# Patient Record
Sex: MALE | Race: WHITE | NOT HISPANIC OR LATINO | Employment: OTHER | URBAN - METROPOLITAN AREA
[De-identification: names, ages, dates, MRNs, and addresses within clinical notes are randomized per-mention and may not be internally consistent; named-entity substitution may affect disease eponyms.]

---

## 2020-11-11 DIAGNOSIS — M19.032 OSTEOARTHRITIS OF LEFT WRIST: ICD-10-CM

## 2020-11-11 DIAGNOSIS — M65.312 TRIGGER THUMB OF LEFT HAND: Primary | ICD-10-CM

## 2020-11-12 ENCOUNTER — CLINICAL SUPPORT (OUTPATIENT)
Dept: REHABILITATION | Facility: HOSPITAL | Age: 60
End: 2020-11-12
Payer: COMMERCIAL

## 2020-11-12 DIAGNOSIS — R29.898 LEFT HAND WEAKNESS: ICD-10-CM

## 2020-11-12 DIAGNOSIS — M25.542 PAIN IN JOINT OF LEFT HAND: Primary | ICD-10-CM

## 2020-11-12 DIAGNOSIS — M25.642 STIFFNESS OF LEFT HAND JOINT: ICD-10-CM

## 2020-11-12 DIAGNOSIS — M19.032 OSTEOARTHRITIS OF LEFT WRIST, UNSPECIFIED OSTEOARTHRITIS TYPE: ICD-10-CM

## 2020-11-12 DIAGNOSIS — M25.442 EFFUSION OF JOINT OF LEFT HAND: ICD-10-CM

## 2020-11-12 DIAGNOSIS — M65.312 TRIGGER THUMB OF LEFT HAND: ICD-10-CM

## 2020-11-12 PROCEDURE — 97110 THERAPEUTIC EXERCISES: CPT | Mod: PN

## 2020-11-12 PROCEDURE — 97165 OT EVAL LOW COMPLEX 30 MIN: CPT | Mod: PN

## 2020-11-12 NOTE — PLAN OF CARE
[facility] Occupational Therapy  Initial Evaluation     Date: 11/12/2020  Name: Oliver Porras  Clinic Number: [id number]    Therapy Diagnosis:   Encounter Diagnoses   Name Primary?    Trigger thumb of left hand     Osteoarthritis of left wrist, unspecified osteoarthritis type     Pain in joint of left hand Yes    Effusion of joint of left hand     Stiffness of left hand joint     Left hand weakness      Physician: Shaji Robb III,*    Physician Orders: evaluate and tx, see epic  Medical Diagnosis: left trigger thumb, OA; left trigger thumb release, proximal row carpectomy  Surgical Procedure and Date: see above, 10-22-20  Evaluation Date: 11/12/2020  Insurance Authorization Period Expiration: referral [id number] 11-11-20 thru 12-31-20  Plan of Care Certification Period: 11-12-20 thru 12-24-20  Date of Return to MD: 12-9-20    Visit # / Visits authorized: 1 / 20  Time In:1245  Time Out: 115  Total Billable Time: 15 minutes    Precautions:  universal  Subjective     Involved Side: left  Dominant Side: right  Date of Onset: about 6-8 months pre surgery  History of Current Condition/Mechanism of Injury: insidious onset, had surgery, had sutures removed and thermoplast wrist splint made at hand therapy clinic linked with referring doctor's office, recently sent here for rehab since closer to home  Imaging: none in epic  Previous Therapy: see above, does state was given wrist and digital ROM    Past Medical History/Physical Systems Review:   Oliver Porras  has no past medical history on file.    Oliver Porras  has no past surgical history on file.    Oliver currently has no medications in their medication list.    Review of patient's allergies indicates:  Not on File     Patient's Goals for Therapy: full painless use    Pain:  Functional Pain Scale Rating 0-10:   1/10 on average  0/10 at best  2/10 at worst  Location: diffuse thru wrist and hand  Description: ache  Aggravating  Factors: light use in splint  Easing Factors: rest  Occupation:  retired  Working presently: no  Duties: Normal self and home care tasks    Functional Limitations/Social History:    Previous functional status includes: Independent with all ADLs.     Current Functional Status   Home/Living environment : lives with nobody    Limitation of Functional Status as follows: decreased motion,  force, and endurance with all tasks   ADLs/IADLs:               See above   Leisure: not tested  pain meds: denies  nicotine: denies  caffeine: 6-10 cups of regular coffee daily    Objective   Posture:in volar wrist thermoplast splint, visually edematous wrist and hand, healed incisions per surgery  Palpation:nt  Sensation:intermittent resolving numbness thumb pulp  ROM:   arom sup/pro 40/80, df/pf 60/80, rd/ud 20/40    Digital prom full except thumb opp on right dpc vs left base of sf and mild tightness on index for composite flex and long for intrinsic tightness    Edema:circumferential     Wrist right 18.0 cm left 20.6 cm        CMS Impairment/Limitation/Restriction for FOTO  Survey    Therapist reviewed FOTO scores for Oliver Porras on 11/12/2020.   FOTO documents entered into [employer] - see Media section.    Limitation Score: 40%  Category: Carrying    Current : CK = at least 40% but < 60% impaired, limited or restricted  Goal: CJ = at least 20% but < 40% impaired, limited or restricted               Treatment     Treatment Time In: 1  Treatment Time Out: 115  Total Treatment time separate from Evaluation time:15    Oliver received therapeutic exercises for 15 minutes including:  See above          Home Exercise Program/Education:  Issued HEP (see patient instructions in EMR) . Exercises were reviewed and Oliver was able to demonstrate them prior to the end of the session.   Pt received a written copy of exercises to perform at home. Oliver demonstrated good understanding of the education provided.  Pt was advised to perform  these exercises free of pain, and to stop performing them if pain occurs.    Patient/Family Education: role of OT, goals for OT, scheduling/cancellations - pt verbalized understanding. Discussed insurance limitations with patient.    Additional Education provided: likely tx progression, expectations of rehab    Assessment     Oliver Porras is a 60 y.o. male referred to outpatient occupational therapy and presents with a medical diagnosis of see above, resulting in Decreased ROM, Decreased muscle strength, Decreased functional hand use, Increased pain, Edema, Joint Stiffness, Scar Adhesions and Diminished/Impaired Sensation and demonstrates limitations as described in the chart below. Following medical record review it is determined that pt will benefit from occupational therapy services in order to maximize pain free and/or functional use of left hand. The following goals were discussed with the patient and patient is in agreement with them as to be addressed in the treatment plan. The patient's rehab potential is good.     Anticipated barriers to occupational therapy: edema, pain, scar, stiffness, weakness  Pt has no cultural, educational or language barriers to learning provided.    Profile and History Assessment of Occupational Performance Level of Clinical Decision Making Complexity Score   Occupational Profile:   Oliver Porras is a 60 y.o. male who lives alone and is retired Oliver Porras has difficulty with  ADLs and IADLs as listed previously, which  affecting his/her daily functional abilities.      Comorbidities:    has no past medical history on file.    Medical and Therapy History Review:   Brief               Performance Deficits    Physical:  Joint Mobility  Muscle Power/Strength  Skin Integrity/Scar Formation  Edema  Pain    Cognitive:  No Deficits    Psychosocial:    No Deficits     Clinical Decision Making:  low    Assessment Process:  Problem-Focused  Assessments    Modification/Need for Assistance:  Not Necessary    Intervention Selection:  Limited Treatment Options       low  Based on PMHX, co morbidities , data from assessments and functional level of assistance required with task and clinical presentation directly impacting function.           The following goals were discussed with the patient and patient is in agreement with them as to be addressed in the treatment plan.     Goals:   stg to be met in 2 weeks 1. Patient will be I with HEP 2. Patient will have 2/10 pain with light use 3. Patient will have = prom of bilateral digits to enhance affected arm use with ADL      ltg to be met by d/c 1. Patient will be I with d/c HEP 2. Patient will have 1/10 pain with all use 3. Patient will have about 75% /pinch  on affected side vs unaffected side to promote full functional use                                                                   4. Patient will be I with all ADL      all goals ongoing unless otherwise noted      Plan   Certification Period/Plan of care expiration: 11/12/2020 to 12-24-20.    Outpatient Occupational Therapy 2 times weekly for 6 weeks to include the following interventions: eval and tx    Above frequency and duration in above dates may change based on patient progress and need for therapy    Victor Hugo MEEK CHT

## 2020-11-12 NOTE — PATIENT INSTRUCTIONS
current home program 11-12-20  -wear splint all the time except to clean skin and do home program  -use hand for very light use in splint  -dont use hand for anything when out of splint  -gently massage scars with lotion at least 5 minutes 4 x day  -do below exercises 10 times, 6 x day

## 2020-11-17 ENCOUNTER — CLINICAL SUPPORT (OUTPATIENT)
Dept: REHABILITATION | Facility: HOSPITAL | Age: 60
End: 2020-11-17
Payer: COMMERCIAL

## 2020-11-17 DIAGNOSIS — M25.642 STIFFNESS OF LEFT HAND JOINT: ICD-10-CM

## 2020-11-17 DIAGNOSIS — M25.442 EFFUSION OF JOINT OF LEFT HAND: ICD-10-CM

## 2020-11-17 DIAGNOSIS — M25.542 PAIN IN JOINT OF LEFT HAND: Primary | ICD-10-CM

## 2020-11-17 DIAGNOSIS — R29.898 LEFT HAND WEAKNESS: ICD-10-CM

## 2020-11-17 PROCEDURE — 97110 THERAPEUTIC EXERCISES: CPT | Mod: PN

## 2020-11-17 NOTE — PATIENT INSTRUCTIONS
current home program 11-17-20  -add below exercise to routine; do 2 x day, hold at least 10 minutes, mild discomfort only  *use strong index and long to push weak thumb down and across palm  -do below exercises 10 times, 6 x day; medium speed, medium force      -wear glove as tolerated-pain free  -decrease caffeine as much as possible since this substance decreases healing and increases your bodys pain response

## 2020-11-17 NOTE — PROGRESS NOTES
Occupational Therapy Daily Treatment Note     Date: 11/17/2020  Name: Oliver Porras  Clinic Number: [id number]    Therapy Diagnosis:   Encounter Diagnoses   Name Primary?    Pain in joint of left hand Yes    Effusion of joint of left hand     Stiffness of left hand joint     Left hand weakness      Physician: Shaji Robb III,*      Physician Orders: evaluate and tx, see epic  Medical Diagnosis: left trigger thumb, OA; left trigger thumb release, proximal row carpectomy  Surgical Procedure and Date: see above, 10-22-20  Evaluation Date: 11/12/2020  Insurance Authorization Period Expiration: referral [id number] 11-11-20 thru 12-31-20  Plan of Care Certification Period: 11-12-20 thru 12-24-20  Date of Return to MD: 12-9-20    Visit # / Visits authorized: 2 / 20  Time In:930  Time Out: 1015  Total Billable Time: 45 minutes    Precautions:  universal    Subjective     Pt reports: no new issues  he is compliant with home exercise program.  Response to previous treatment:good  Functional change: light use in splint slowly improving    Pain: 0/10 rest; 3/10 light use  Location: diffuse      Wrist ache  Objective       Timed units:  3 therapeutic exercise                            Time: 930-1015    Untimed units:  n/a      Measurements:     n/a        Fluido: 15'  U/s:n/a      UBE: level 1 x 10'    Scar massage: reviewed    Stretches as tolerated-pain free: thumb opp      Manual: n/a    ROM:  arom df/pf x 10, light towel grabs x 5', wrist maze x 5'    PRE: n/a      HEP: see above    Home Exercises and Education Provided     Education provided:     purpose, use, and precautions of left large isotoner therapeutic glove issued today      progress towards goals   likely tx progression  rationale of rehab interventions    Written Home Exercises Provided: yes.          Previously issued exercises were reviewed if still part of current tx plan as well as any issued today and Oliver was able to demonstrate them prior  to the end of the session.  Oliver demonstrated good understanding of the HEP provided.     See EMR under patient instructions and/or media for HEP issued today or in past    Assessment       May benefit from edema glove as well as slow transition to df/pf stretches      Pt would continue to benefit from skilled OT in order to facilitate improved functional use and protect surgical site.    Oliver is progressing well towards his goals and there are no updates to goals at this time. Pt prognosis is good  Pt will continue to benefit from skilled outpatient occupational therapy to address the deficits listed in the problem list on initial evaluation to provide pt/family education and to maximize pt's level of independence in the home and community environment.     Anticipated barriers to occupational therapy: edema, pain, scar, weakness, stiffness    Pt's spiritual, cultural and educational needs considered and pt agreeable to plan of care and goals.    Goals:  stg to be met in 2 weeks 1. Patient will be I with HEP 2. Patient will have 2/10 pain with light use 3. Patient will have = prom of bilateral digits to enhance affected arm use with ADL        ltg to be met by d/c 1. Patient will be I with d/c HEP 2. Patient will have 1/10 pain with all use 3. Patient will have about 75% /pinch  on affected side vs unaffected side to promote full functional use                                                                   4. Patient will be I with all ADL       all goals ongoing unless otherwise noted  stg to be met in 2 weeks 1. Patient will be I with HEP 2. Patient will have 2/10 pain with light use 3. Patient will have = prom of bilateral digits to enhance affected arm use with ADL        ltg to be met by d/c 1. Patient will be I with d/c HEP 2. Patient will have 1/10 pain with all use 3. Patient will have about 75% /pinch  on affected side vs unaffected side to promote full functional use                                                                    4. Patient will be I with all ADL       all goals ongoing unless otherwise noted              Any goals met today ?  (if any met see above)    Updates/Grading for next session: prn    Plan: splint per protocol and md instruction, ROM, scar massage, edema control, PRE, patient education      Victor Hugo Palomares, OT/CHT

## 2020-11-19 ENCOUNTER — CLINICAL SUPPORT (OUTPATIENT)
Dept: REHABILITATION | Facility: HOSPITAL | Age: 60
End: 2020-11-19
Payer: COMMERCIAL

## 2020-11-19 DIAGNOSIS — M25.542 PAIN IN JOINT OF LEFT HAND: Primary | ICD-10-CM

## 2020-11-19 DIAGNOSIS — R29.898 LEFT HAND WEAKNESS: ICD-10-CM

## 2020-11-19 DIAGNOSIS — M25.642 STIFFNESS OF LEFT HAND JOINT: ICD-10-CM

## 2020-11-19 DIAGNOSIS — M25.442 EFFUSION OF JOINT OF LEFT HAND: ICD-10-CM

## 2020-11-19 PROCEDURE — 97110 THERAPEUTIC EXERCISES: CPT | Mod: PN

## 2020-11-19 NOTE — PROGRESS NOTES
Occupational Therapy Daily Treatment Note     Date: 11/19/2020  Name: Oliver Porras  Clinic Number: [id number]    Therapy Diagnosis:   Encounter Diagnoses   Name Primary?    Pain in joint of left hand Yes    Effusion of joint of left hand     Stiffness of left hand joint     Left hand weakness      Physician: Shaji Robb III,*      Physician Orders: evaluate and tx, see epic  Medical Diagnosis: left trigger thumb, OA; left trigger thumb release, proximal row carpectomy  Surgical Procedure and Date: see above, 10-22-20  Evaluation Date: 11/12/2020  Insurance Authorization Period Expiration: referral [id number] 11-11-20 thru 12-31-20  Plan of Care Certification Period: 11-12-20 thru 12-24-20  Date of Return to MD: 12-9-20    Visit # / Visits authorized: 2 / 20 referral [id number] 11- thru 12-31-20  Time In:845  Time Out: 915  Total Billable Time: 30 minutes    Precautions:  universal    Subjective     Pt reports: no new issues  he is compliant with home exercise program.  Response to previous treatment:good  Functional change: light use in splint slowly improving day to day    Pain: 0/10 rest; 3/10 light use  Location: diffuse      Wrist ache  Objective       Timed units:  2 therapeutic exercise                            Time:845-915    Untimed units:  n/a    Measurements:     n/a        Fluido: n/a  U/s:n/a      UBE: level 1 x 10'    Scar massage: n/a    Stretches as tolerated-pain free: thumb opp      Manual: n/a    ROM:  light towel grabs x 5', wrist maze x 5'    PRE: n/a      HEP: see above    Home Exercises and Education Provided     Education provided:     purpose, use, and precautions of left large isotoner therapeutic glove issued today      progress towards goals   likely tx progression  rationale of rehab interventions    Written Home Exercises Provided: np        Previously issued exercises were reviewed if still part of current tx plan as well as any issued today and Oliver was able to  demonstrate them prior to the end of the session.  Oliver demonstrated good understanding of the HEP provided.     See EMR under patient instructions and/or media for HEP issued today or in past    Assessment       Looks faithful with HEP      Pt would continue to benefit from skilled OT in order to facilitate improved functional use and protect surgical site.    Oliver is progressing well towards his goals and there are no updates to goals at this time. Pt prognosis is good  Pt will continue to benefit from skilled outpatient occupational therapy to address the deficits listed in the problem list on initial evaluation to provide pt/family education and to maximize pt's level of independence in the home and community environment.     Anticipated barriers to occupational therapy: edema, pain, scar, weakness, stiffness    Pt's spiritual, cultural and educational needs considered and pt agreeable to plan of care and goals.    Goals:  stg to be met in 2 weeks 1. Patient will be I with HEP 2. Patient will have 2/10 pain with light use 3. Patient will have = prom of bilateral digits to enhance affected arm use with ADL        ltg to be met by d/c 1. Patient will be I with d/c HEP 2. Patient will have 1/10 pain with all use 3. Patient will have about 75% /pinch  on affected side vs unaffected side to promote full functional use                                                                   4. Patient will be I with all ADL       all goals ongoing unless otherwise noted  stg to be met in 2 weeks 1. Patient will be I with HEP 2. Patient will have 2/10 pain with light use 3. Patient will have = prom of bilateral digits to enhance affected arm use with ADL        ltg to be met by d/c 1. Patient will be I with d/c HEP 2. Patient will have 1/10 pain with all use 3. Patient will have about 75% /pinch  on affected side vs unaffected side to promote full functional use                                                                    4. Patient will be I with all ADL       all goals ongoing unless otherwise noted              Any goals met today ?  (if any met see above)    Updates/Grading for next session: prn    Plan: splint per protocol and md instruction, ROM, scar massage, edema control, PRE, patient education      Victor Hugo Palomares, OT/CHT

## 2020-11-24 ENCOUNTER — CLINICAL SUPPORT (OUTPATIENT)
Dept: REHABILITATION | Facility: HOSPITAL | Age: 60
End: 2020-11-24
Payer: COMMERCIAL

## 2020-11-24 DIAGNOSIS — M25.442 EFFUSION OF JOINT OF LEFT HAND: ICD-10-CM

## 2020-11-24 DIAGNOSIS — R29.898 LEFT HAND WEAKNESS: ICD-10-CM

## 2020-11-24 DIAGNOSIS — M25.542 PAIN IN JOINT OF LEFT HAND: Primary | ICD-10-CM

## 2020-11-24 DIAGNOSIS — M25.642 STIFFNESS OF LEFT HAND JOINT: ICD-10-CM

## 2020-11-24 PROCEDURE — 97110 THERAPEUTIC EXERCISES: CPT | Mod: PN

## 2020-11-24 NOTE — PROGRESS NOTES
Occupational Therapy Daily Treatment Note     Date: 11/24/2020  Name: Oliver Porras  Clinic Number: [id number]    Therapy Diagnosis:   Encounter Diagnoses   Name Primary?    Pain in joint of left hand Yes    Effusion of joint of left hand     Stiffness of left hand joint     Left hand weakness      Physician: Shaji Robb III,*      Physician Orders: evaluate and tx, see epic  Medical Diagnosis: left trigger thumb, OA; left trigger thumb release, proximal row carpectomy  Surgical Procedure and Date: see above, 10-22-20  Evaluation Date: 11/12/2020  Insurance Authorization Period Expiration: referral [id number] 11-11-20 thru 12-31-20  Plan of Care Certification Period: 11-12-20 thru 12-24-20  Date of Return to MD: 12-9-20    Visit # / Visits authorized: 3 / 20 referral [id number] 11- thru 12-31-20  Time In:845  Time Out: 930  Total Billable Time: 45 minutes    Precautions:  universal    Subjective     Pt reports: no new issues, understands rationale of tx  he is compliant with home exercise program.  Response to previous treatment:good  Functional change: light use in splint slowly improving day to day     Pain: 0/10 rest; 3/10 light use  Location: diffuse      Wrist ache  Objective       Timed units:  3 therapeutic exercise                            Time:845-930    Untimed units:  n/a    Measurements:     arom sup/pro 86/84        Fluido: n/a  U/s:n/a      UBE: level 1 x 10'    Scar massage: n/a    Stretches as tolerated-pain free: thumb opp      Manual: n/a    ROM:  light towel grabs x 5', wrist maze x 5', fingerboard small dowel small velcro strip (dowel half on strip) opening hand against dowel, weight well loose  no weight palms down until fatigue    PRE: n/a      HEP: see above      s/p 4 weeks 5 days s/p          Home Exercises and Education Provided     Education provided:     Likely timeline for stretching and PRE        progress towards goals   likely tx progression  rationale of rehab  interventions    Written Home Exercises Provided: no        Previously issued exercises were reviewed if still part of current tx plan as well as any issued today and Oliver was able to demonstrate them prior to the end of the session.  Olievr demonstrated good understanding of the HEP provided.     See EMR under patient instructions and/or media for HEP issued today or in past    Assessment       Looks faithful with HEP, edema decreasing nicely, scar continues to show increased pliability      Pt would continue to benefit from skilled OT in order to facilitate improved functional use and protect surgical site.    Oliver is progressing well towards his goals and there are no updates to goals at this time. Pt prognosis is good  Pt will continue to benefit from skilled outpatient occupational therapy to address the deficits listed in the problem list on initial evaluation to provide pt/family education and to maximize pt's level of independence in the home and community environment.     Anticipated barriers to occupational therapy: edema, pain, scar, weakness, stiffness    Pt's spiritual, cultural and educational needs considered and pt agreeable to plan of care and goals.    Goals:  stg to be met in 2 weeks 1. Patient will be I with HEP 2. Patient will have 2/10 pain with light use 3. Patient will have = prom of bilateral digits to enhance affected arm use with ADL        ltg to be met by d/c 1. Patient will be I with d/c HEP 2. Patient will have 1/10 pain with all use 3. Patient will have about 75% /pinch  on affected side vs unaffected side to promote full functional use                                                                   4. Patient will be I with all ADL       all goals ongoing unless otherwise noted  stg to be met in 2 weeks 1. Patient will be I with HEP 2. Patient will have 2/10 pain with light use 3. Patient will have = prom of bilateral digits to enhance affected arm use with ADL         ltg to be met by d/c 1. Patient will be I with d/c HEP 2. Patient will have 1/10 pain with all use 3. Patient will have about 75% /pinch  on affected side vs unaffected side to promote full functional use                                                                   4. Patient will be I with all ADL       all goals ongoing unless otherwise noted              Any goals met today ?  (if any met see above)    Updates/Grading for next session: prn    Plan: splint per protocol and md instruction, ROM, scar massage, edema control, PRE, patient education      Victor Hugo Palomares, OT/CHT

## 2020-11-27 ENCOUNTER — CLINICAL SUPPORT (OUTPATIENT)
Dept: REHABILITATION | Facility: HOSPITAL | Age: 60
End: 2020-11-27
Payer: COMMERCIAL

## 2020-11-27 DIAGNOSIS — R29.898 LEFT HAND WEAKNESS: ICD-10-CM

## 2020-11-27 DIAGNOSIS — M25.442 EFFUSION OF JOINT OF LEFT HAND: ICD-10-CM

## 2020-11-27 DIAGNOSIS — M25.542 PAIN IN JOINT OF LEFT HAND: Primary | ICD-10-CM

## 2020-11-27 DIAGNOSIS — M25.642 STIFFNESS OF LEFT HAND JOINT: ICD-10-CM

## 2020-11-27 PROCEDURE — 97110 THERAPEUTIC EXERCISES: CPT | Mod: PN

## 2020-11-27 NOTE — PATIENT INSTRUCTIONS
current home program 11-27-20  -do below stretch at least 2 x day for at least 10 minutes, mild discomfort only  *use right hand to gently stretch left thumb up and straight

## 2020-11-27 NOTE — PROGRESS NOTES
Occupational Therapy Daily Treatment Note     Date: 11/27/2020  Name: Oliver Porras  Clinic Number: [id number]    Therapy Diagnosis:   Encounter Diagnoses   Name Primary?    Pain in joint of left hand Yes    Effusion of joint of left hand     Stiffness of left hand joint     Left hand weakness      Physician: Shaji Robb III,*      Physician Orders: evaluate and tx, see epic  Medical Diagnosis: left trigger thumb, OA; left trigger thumb release, proximal row carpectomy  Surgical Procedure and Date: see above, 10-22-20  Evaluation Date: 11/12/2020  Insurance Authorization Period Expiration: referral [id number] 11-11-20 thru 12-31-20  Plan of Care Certification Period: 11-12-20 thru 12-24-20  Date of Return to MD: 12-9-20    Visit # / Visits authorized: 4 / 20 referral [id number] 11- thru 12-31-20  Time In:1030  Time Out: 1115  Total Billable Time: 45 minutes    Precautions:  universal    Subjective     Pt reports: no new issues  he is compliant with home exercise program.  Response to previous treatment:good  Functional change: light use in splint slowly improving day to day with self care and light use around house    Pain: 0/10 rest; 3/10 light use  Location: diffuse      wrist ache  Objective       Timed units:  3 therapeutic exercise                            Time:7374-6307    Untimed units:  n/a    Measurements:     n/a      Fluido: n/a  U/s:n/a      UBE: level 1 x 10'    Scar massage: n/a    Stretches as tolerated-pain free: thumb opp      Manual: n/a    ROM:  light towel grabs x 5', wrist maze x 5', fingerboard small dowel small velcro strip (dowel half on strip) opening hand against dowel, weight well loose  no weight palms up as well as palms down until fatigue    PRE: n/a      HEP: see above              Home Exercises and Education Provided     Education provided:     Edema's influence on joint motion        progress towards goals   likely tx progression  rationale of rehab  interventions    Written Home Exercises Provided: yes        Previously issued exercises were reviewed if still part of current tx plan as well as any issued today and Oliver was able to demonstrate them prior to the end of the session.  Oliver demonstrated good understanding of the HEP provided.     See EMR under patient instructions and/or media for HEP issued today or in past    Assessment       Gentle wrist stretching initiated in 1 week or so should help to minimize stiffness and improve overall use and mechanics long term, mild tension noted with full passive composite thumb extension      Pt would continue to benefit from skilled OT in order to facilitate improved functional use and protect surgical site.    Oliver is progressing well towards his goals and there are no updates to goals at this time. Pt prognosis is good  Pt will continue to benefit from skilled outpatient occupational therapy to address the deficits listed in the problem list on initial evaluation to provide pt/family education and to maximize pt's level of independence in the home and community environment.     Anticipated barriers to occupational therapy: edema, pain, scar, weakness, stiffness    Pt's spiritual, cultural and educational needs considered and pt agreeable to plan of care and goals.    Goals:  stg to be met in 2 weeks 1. Patient will be I with HEP 2. Patient will have 2/10 pain with light use 3. Patient will have = prom of bilateral digits to enhance affected arm use with ADL        ltg to be met by d/c 1. Patient will be I with d/c HEP 2. Patient will have 1/10 pain with all use 3. Patient will have about 75% /pinch  on affected side vs unaffected side to promote full functional use                                                                   4. Patient will be I with all ADL       all goals ongoing unless otherwise noted  stg to be met in 2 weeks 1. Patient will be I with HEP 2. Patient will have 2/10 pain with  light use 3. Patient will have = prom of bilateral digits to enhance affected arm use with ADL        ltg to be met by d/c 1. Patient will be I with d/c HEP 2. Patient will have 1/10 pain with all use 3. Patient will have about 75% /pinch  on affected side vs unaffected side to promote full functional use                                                                   4. Patient will be I with all ADL       all goals ongoing unless otherwise noted              Any goals met today ?  (if any met see above)    Updates/Grading for next session: prn, consider gentle wrist stretches about 6 weeks s/p    Plan: splint per protocol and md instruction, ROM, scar massage, edema control, PRE, patient education      Victor Hugo Palomares, OT/CHT

## 2020-12-01 ENCOUNTER — CLINICAL SUPPORT (OUTPATIENT)
Dept: REHABILITATION | Facility: HOSPITAL | Age: 60
End: 2020-12-01
Payer: COMMERCIAL

## 2020-12-01 DIAGNOSIS — M25.642 STIFFNESS OF LEFT HAND JOINT: ICD-10-CM

## 2020-12-01 DIAGNOSIS — M25.542 PAIN IN JOINT OF LEFT HAND: Primary | ICD-10-CM

## 2020-12-01 DIAGNOSIS — R29.898 LEFT HAND WEAKNESS: ICD-10-CM

## 2020-12-01 DIAGNOSIS — M25.442 EFFUSION OF JOINT OF LEFT HAND: ICD-10-CM

## 2020-12-01 PROCEDURE — 97110 THERAPEUTIC EXERCISES: CPT | Mod: PN

## 2020-12-01 NOTE — PROGRESS NOTES
Occupational Therapy Daily Treatment Note     Date: 12/1/2020  Name: Oliver Porras  Clinic Number: [id number]    Therapy Diagnosis:   Encounter Diagnoses   Name Primary?    Pain in joint of left hand Yes    Effusion of joint of left hand     Stiffness of left hand joint     Left hand weakness      Physician: Shaji Robb III,*      Physician Orders: evaluate and tx, see epic  Medical Diagnosis: left trigger thumb, OA; left trigger thumb release, proximal row carpectomy  Surgical Procedure and Date: see above, 10-22-20  Evaluation Date: 11/12/2020  Insurance Authorization Period Expiration: referral [id number] 11-11-20 thru 12-31-20  Plan of Care Certification Period: 11-12-20 thru 12-24-20  Date of Return to MD: 12-9-20    Visit # / Visits authorized: 5 / 20 referral [id number] 11- thru 12-31-20  Time In:925  Time Out: 1010  Total Billable Time: 45 minutes    Precautions:  universal    Subjective     Pt reports: no new issues  he is compliant with home exercise program.  Response to previous treatment:good  Functional change: light use in splint with noticeable increase since day 1 of OT  Pain: 0/10 rest; 3/10 light use  Location: diffuse      wrist ache  Objective       Timed units:  3 therapeutic exercise                            Time:925-1010    Untimed units:  n/a    Measurements:     n/a      Fluido: n/a  U/s:n/a      UBE: level 1 x 10'    Scar massage: reviewed    Stretches as tolerated-pain free: thumb opp      Manual: n/a    ROM:  light towel grabs x 5', wrist maze x 5', fingerboard small dowel small velcro strip (dowel half on strip) opening hand against dowel, weight well loose  no weight palms up as well as palms down until fatigue    PRE: n/a      HEP: see above              Home Exercises and Education Provided     Education provided:     Likely timeline to start wrist stretches        progress towards goals   likely tx progression  rationale of rehab interventions    Written Home  Exercises Provided: no      Previously issued exercises were reviewed if still part of current tx plan as well as any issued today and Oliver was able to demonstrate them prior to the end of the session.  Oliver demonstrated good understanding of the HEP provided.     See EMR under patient instructions and/or media for HEP issued today or in past    Assessment       Looks faithful with HEP, edema and scar adherence continue to diminish      Pt would continue to benefit from skilled OT in order to facilitate improved functional use and protect surgical site.    Oliver is progressing well towards his goals and there are no updates to goals at this time. Pt prognosis is good  Pt will continue to benefit from skilled outpatient occupational therapy to address the deficits listed in the problem list on initial evaluation to provide pt/family education and to maximize pt's level of independence in the home and community environment.     Anticipated barriers to occupational therapy: edema, pain, scar, weakness, stiffness    Pt's spiritual, cultural and educational needs considered and pt agreeable to plan of care and goals.    Goals:  stg to be met in 2 weeks 1. Patient will be I with HEP 2. Patient will have 2/10 pain with light use 3. Patient will have = prom of bilateral digits to enhance affected arm use with ADL        ltg to be met by d/c 1. Patient will be I with d/c HEP 2. Patient will have 1/10 pain with all use 3. Patient will have about 75% /pinch  on affected side vs unaffected side to promote full functional use                                                                   4. Patient will be I with all ADL       all goals ongoing unless otherwise noted  stg to be met in 2 weeks 1. Patient will be I with HEP 2. Patient will have 2/10 pain with light use 3. Patient will have = prom of bilateral digits to enhance affected arm use with ADL        ltg to be met by d/c 1. Patient will be I with d/c HEP 2.  Patient will have 1/10 pain with all use 3. Patient will have about 75% /pinch  on affected side vs unaffected side to promote full functional use                                                                   4. Patient will be I with all ADL       all goals ongoing unless otherwise noted              Any goals met today ?  (if any met see above)    Updates/Grading for next session: prn, consider testing prom wrist and digits and starting wrist stretches later in week    Plan: splint per protocol and md instruction, ROM, scar massage, edema control, PRE, patient education      Victor Hugo Palomares, OT/CHT

## 2020-12-03 ENCOUNTER — CLINICAL SUPPORT (OUTPATIENT)
Dept: REHABILITATION | Facility: HOSPITAL | Age: 60
End: 2020-12-03
Payer: COMMERCIAL

## 2020-12-03 DIAGNOSIS — M25.642 STIFFNESS OF LEFT HAND JOINT: ICD-10-CM

## 2020-12-03 DIAGNOSIS — M25.442 EFFUSION OF JOINT OF LEFT HAND: ICD-10-CM

## 2020-12-03 DIAGNOSIS — M25.542 PAIN IN JOINT OF LEFT HAND: Primary | ICD-10-CM

## 2020-12-03 DIAGNOSIS — R29.898 LEFT HAND WEAKNESS: ICD-10-CM

## 2020-12-03 PROCEDURE — 97110 THERAPEUTIC EXERCISES: CPT | Mod: PN

## 2020-12-03 NOTE — PROGRESS NOTES
Occupational Therapy Daily Treatment Note     Date: 12/3/2020  Name: Oliver Porras  Clinic Number: [id number]    Therapy Diagnosis:   Encounter Diagnoses   Name Primary?    Pain in joint of left hand Yes    Effusion of joint of left hand     Stiffness of left hand joint     Left hand weakness      Physician: Shaji Robb III,*      Physician Orders: evaluate and tx, see epic  Medical Diagnosis: left trigger thumb, OA; left trigger thumb release, proximal row carpectomy  Surgical Procedure and Date: see above, 10-22-20  Evaluation Date: 11/12/2020  Insurance Authorization Period Expiration: referral [id number] 11-11-20 thru 12-31-20  Plan of Care Certification Period: 11-12-20 thru 12-24-20  Date of Return to MD: 12-9-20    Visit # / Visits authorized: 6 / 20 referral [id number] 11- thru 12-31-20  Time In:915  Time Out: 10  Total Billable Time: 45 minutes    Precautions:  universal    Subjective     Pt reports: no new issues, understands low load prolonged stretch concept purpose and application to HEP  he is compliant with home exercise program.  Response to previous treatment:good  Functional change: light use in splint with noticeable increase since day 1 of OT  Pain: 0/10 rest; 3/10 light use  Location: diffuse      wrist ache  Objective       Timed units:  3 therapeutic exercise                            Time:915-10    Untimed units:  n/a    Measurements:     Prom                Right           Left   Df/pf                80/90           32/32  Rd/ud              20/40           20/30      Fluido: n/a  U/s:n/a      UBE: level 1 x 10'    Scar massage: reviewed    Stretches as tolerated-pain free: thumb opp, df/pf      Manual: n/a    ROM:  light towel grabs x 5', wrist maze x 5', fingerboard small dowel small velcro strip (dowel half on strip) opening hand against dowel, weight well loose  no weight palms up as well as palms down until fatigue    PRE: n/a      HEP: see above              Home  Exercises and Education Provided     Education provided:     Low load prolonged stretches, explained ROM on affected wrist likely will not equal ROM on unaffected wrist long term due to nature of surgery        progress towards goals   likely tx progression  rationale of rehab interventions    Written Home Exercises Provided: yes      Previously issued exercises were reviewed if still part of current tx plan as well as any issued today and Oliver was able to demonstrate them prior to the end of the session.  Oliver demonstrated good understanding of the HEP provided.     See EMR under patient instructions and/or media for HEP issued today or in past    Assessment       Gentle wrist stretches indicated to maximize motion long term      Pt would continue to benefit from skilled OT in order to facilitate improved functional use and protect surgical site.    Oliver is progressing well towards his goals and there are no updates to goals at this time. Pt prognosis is good  Pt will continue to benefit from skilled outpatient occupational therapy to address the deficits listed in the problem list on initial evaluation to provide pt/family education and to maximize pt's level of independence in the home and community environment.     Anticipated barriers to occupational therapy: edema, pain, scar, weakness, stiffness    Pt's spiritual, cultural and educational needs considered and pt agreeable to plan of care and goals.    Goals:  stg to be met in 2 weeks 1. Patient will be I with HEP 2. Patient will have 2/10 pain with light use 3. Patient will have = prom of bilateral digits to enhance affected arm use with ADL        ltg to be met by d/c 1. Patient will be I with d/c HEP 2. Patient will have 1/10 pain with all use 3. Patient will have about 75% /pinch  on affected side vs unaffected side to promote full functional use                                                                   4. Patient will be I with all  ADL       all goals ongoing unless otherwise noted  stg to be met in 2 weeks 1. Patient will be I with HEP 2. Patient will have 2/10 pain with light use 3. Patient will have = prom of bilateral digits to enhance affected arm use with ADL        ltg to be met by d/c 1. Patient will be I with d/c HEP 2. Patient will have 1/10 pain with all use 3. Patient will have about 75% /pinch  on affected side vs unaffected side to promote full functional use                                                                   4. Patient will be I with all ADL       all goals ongoing unless otherwise noted              Any goals met today ?  (if any met see above)    Updates/Grading for next session: prn    Plan: splint per protocol and md instruction, ROM, scar massage, edema control, PRE, patient education      Victor Hugo Palomares OT/CHT

## 2020-12-08 ENCOUNTER — CLINICAL SUPPORT (OUTPATIENT)
Dept: REHABILITATION | Facility: HOSPITAL | Age: 60
End: 2020-12-08
Payer: COMMERCIAL

## 2020-12-08 DIAGNOSIS — M25.642 STIFFNESS OF LEFT HAND JOINT: ICD-10-CM

## 2020-12-08 DIAGNOSIS — M25.542 PAIN IN JOINT OF LEFT HAND: Primary | ICD-10-CM

## 2020-12-08 DIAGNOSIS — R29.898 LEFT HAND WEAKNESS: ICD-10-CM

## 2020-12-08 DIAGNOSIS — M25.442 EFFUSION OF JOINT OF LEFT HAND: ICD-10-CM

## 2020-12-08 PROCEDURE — 97110 THERAPEUTIC EXERCISES: CPT | Mod: PN

## 2020-12-08 NOTE — PROGRESS NOTES
Occupational Therapy Daily Treatment Note     Date: 12/8/2020  Name: Oliver Porras  Clinic Number: [id number]    Therapy Diagnosis:   Encounter Diagnoses   Name Primary?    Pain in joint of left hand Yes    Effusion of joint of left hand     Stiffness of left hand joint     Left hand weakness      Physician: Shaji Robb III,*      Physician Orders: evaluate and tx, see epic  Medical Diagnosis: left trigger thumb, OA; left trigger thumb release, proximal row carpectomy  Surgical Procedure and Date: see above, 10-22-20  Evaluation Date: 11/12/2020  Insurance Authorization Period Expiration: referral [id number] 11-11-20 thru 12-31-20  Plan of Care Certification Period: 11-12-20 thru 12-24-20  Date of Return to MD: 12-9-20    Visit # / Visits authorized: 7 / 20 referral [id number] 11- thru 12-31-20  Time In:930  Time Out: 1015  Total Billable Time: 45 minutes    Precautions:  universal    Subjective     Pt reports: no new issues, sees referring md tomorrow, says had some increased soreness since last visit  he is compliant with home exercise program.  Response to previous treatment:good  Functional change: nothing significant since last visit  Pain: 0/10 rest; 3/10 light use  Location: diffuse      wrist ache  Objective       Timed units:  3 therapeutic exercise                            Time:930-1015    Untimed units:  n/a    Measurements:     mcp 2 thru 5 right 21.2 cm left 21.6 cm    Fluido: n/a  U/s:n/a      UBE: level 1 x 10'    Scar massage: reviewed    Stretches as tolerated-pain free: thumb opp, df/pf      Manual: n/a    ROM:  light towel grabs x 5', wrist maze x 5', fingerboard small dowel small velcro strip (dowel half on strip) opening hand against dowel, weight well loose  no weight palms up as well as palms down until fatigue    PRE: n/a      HEP: see above              Home Exercises and Education Provided     Education provided:             progress towards goals   likely tx  progression  rationale of rehab interventions    Written Home Exercises Provided: no    Previously issued exercises were reviewed if still part of current tx plan as well as any issued today and Oliver was able to demonstrate them prior to the end of the session.  Oliver demonstrated good understanding of the HEP provided.     See EMR under patient instructions and/or media for HEP issued today or in past    Assessment       May benefit from  and pinch PRE soon      Pt would continue to benefit from skilled OT in order to facilitate improved functional use and protect surgical site.    Oliver is progressing well towards his goals and there are no updates to goals at this time. Pt prognosis is good  Pt will continue to benefit from skilled outpatient occupational therapy to address the deficits listed in the problem list on initial evaluation to provide pt/family education and to maximize pt's level of independence in the home and community environment.     Anticipated barriers to occupational therapy: edema, pain, scar, weakness, stiffness    Pt's spiritual, cultural and educational needs considered and pt agreeable to plan of care and goals.    Goals:  stg to be met in 2 weeks 1. Patient will be I with HEP 2. Patient will have 2/10 pain with light use 3. Patient will have = prom of bilateral digits to enhance affected arm use with ADL        ltg to be met by d/c 1. Patient will be I with d/c HEP 2. Patient will have 1/10 pain with all use 3. Patient will have about 75% /pinch  on affected side vs unaffected side to promote full functional use                                                                   4. Patient will be I with all ADL       all goals ongoing unless otherwise noted  stg to be met in 2 weeks 1. Patient will be I with HEP 2. Patient will have 2/10 pain with light use 3. Patient will have = prom of bilateral digits to enhance affected arm use with ADL        ltg to be met by d/c 1.  Patient will be I with d/c HEP 2. Patient will have 1/10 pain with all use 3. Patient will have about 75% /pinch  on affected side vs unaffected side to promote full functional use                                                                   4. Patient will be I with all ADL       all goals ongoing unless otherwise noted              Any goals met today ?  (if any met see above)    Updates/Grading for next session: prn    Plan: splint per protocol and md instruction, ROM, scar massage, edema control, PRE, patient education      Victor Hugo Palomares, OT/CHT

## 2020-12-10 ENCOUNTER — CLINICAL SUPPORT (OUTPATIENT)
Dept: REHABILITATION | Facility: HOSPITAL | Age: 60
End: 2020-12-10
Payer: COMMERCIAL

## 2020-12-10 DIAGNOSIS — M25.442 EFFUSION OF JOINT OF LEFT HAND: ICD-10-CM

## 2020-12-10 DIAGNOSIS — M25.642 STIFFNESS OF LEFT HAND JOINT: ICD-10-CM

## 2020-12-10 DIAGNOSIS — R29.898 LEFT HAND WEAKNESS: ICD-10-CM

## 2020-12-10 DIAGNOSIS — M25.542 PAIN IN JOINT OF LEFT HAND: Primary | ICD-10-CM

## 2020-12-10 PROCEDURE — 97022 WHIRLPOOL THERAPY: CPT | Mod: PN

## 2020-12-10 PROCEDURE — 97110 THERAPEUTIC EXERCISES: CPT | Mod: PN

## 2020-12-10 NOTE — PROGRESS NOTES
Occupational Therapy Daily Treatment Note     Date: 12/10/2020  Name: Oliver Porras  Clinic Number: [id number]    Therapy Diagnosis:   Encounter Diagnoses   Name Primary?    Pain in joint of left hand Yes    Effusion of joint of left hand     Stiffness of left hand joint     Left hand weakness      Physician: Shaji Robb III,*      Physician Orders: evaluate and tx, see epic  Medical Diagnosis: left trigger thumb, OA; left trigger thumb release, proximal row carpectomy  Surgical Procedure and Date: see above, 10-22-20  Evaluation Date: 11/12/2020  Insurance Authorization Period Expiration: referral [id number] 11-11-20 thru 12-31-20  Plan of Care Certification Period: 11-12-20 thru 12-24-20  Date of Return to MD: 12-9-20    Visit # / Visits authorized: 8 / 20 referral [id number] 11- thru 12-31-20  Time In:830  Time Out: 915  Total Billable Time: 45 minutes    Precautions:  universal    Subjective     Pt reports: no new issues, reports saw referring md who discharged splint and wants continued OT  he is compliant with home exercise program.  Response to previous treatment:good  Functional change: light grasp with basic use continues to improve  Pain: 0/10 rest; 3/10 light use  Location: diffuse      wrist ache  Objective       Timed units:  2 therapeutic exercise                            Time:845-915    Untimed units:  fluidotherapy                                        Time: 830-845    Measurements:     N/a    Fluido: n/a  U/s:n/a      UBE: level 1 x 10'    Scar massage: reviewed    Stretches as tolerated-pain free: thumb opp, df/pf      Manual: n/a    ROM:  light towel grabs x 5', wrist maze x 5'    PRE: n/a      HEP: see above              Home Exercises and Education Provided     Education provided:     Probable HEP modifications over next 2 weeks or so        progress towards goals   likely tx progression  rationale of rehab interventions    Written Home Exercises Provided: no    Previously  issued exercises were reviewed if still part of current tx plan as well as any issued today and Oliver was able to demonstrate them prior to the end of the session.  Oliver demonstrated good understanding of the HEP provided.     See EMR under patient instructions and/or media for HEP issued today or in past    Assessment       Scar adherence and edema continue to decrease, may eventually benefit from heating pad pre home stretches      Pt would continue to benefit from skilled OT in order to facilitate improved functional use and protect surgical site.    Oliver is progressing well towards his goals and there are no updates to goals at this time. Pt prognosis is good  Pt will continue to benefit from skilled outpatient occupational therapy to address the deficits listed in the problem list on initial evaluation to provide pt/family education and to maximize pt's level of independence in the home and community environment.     Anticipated barriers to occupational therapy: edema, pain, scar, weakness, stiffness    Pt's spiritual, cultural and educational needs considered and pt agreeable to plan of care and goals.    Goals:  stg to be met in 2 weeks 1. Patient will be I with HEP 2. Patient will have 2/10 pain with light use 3. Patient will have = prom of bilateral digits to enhance affected arm use with ADL        ltg to be met by d/c 1. Patient will be I with d/c HEP 2. Patient will have 1/10 pain with all use 3. Patient will have about 75% /pinch  on affected side vs unaffected side to promote full functional use                                                                   4. Patient will be I with all ADL       all goals ongoing unless otherwise noted  stg to be met in 2 weeks 1. Patient will be I with HEP 2. Patient will have 2/10 pain with light use 3. Patient will have = prom of bilateral digits to enhance affected arm use with ADL        ltg to be met by d/c 1. Patient will be I with d/c HEP 2.  Patient will have 1/10 pain with all use 3. Patient will have about 75% /pinch  on affected side vs unaffected side to promote full functional use                                                                   4. Patient will be I with all ADL       all goals ongoing unless otherwise noted              Any goals met today ?  (if any met see above)    Updates/Grading for next session: prn    Plan: splint per protocol and md instruction, ROM, scar massage, edema control, PRE, patient education      Victor Hugo Palomares, OT/CHT

## 2020-12-15 ENCOUNTER — CLINICAL SUPPORT (OUTPATIENT)
Dept: REHABILITATION | Facility: HOSPITAL | Age: 60
End: 2020-12-15
Payer: COMMERCIAL

## 2020-12-15 DIAGNOSIS — M25.542 PAIN IN JOINT OF LEFT HAND: Primary | ICD-10-CM

## 2020-12-15 DIAGNOSIS — M25.442 EFFUSION OF JOINT OF LEFT HAND: ICD-10-CM

## 2020-12-15 DIAGNOSIS — R29.898 LEFT HAND WEAKNESS: ICD-10-CM

## 2020-12-15 DIAGNOSIS — M25.642 STIFFNESS OF LEFT HAND JOINT: ICD-10-CM

## 2020-12-15 PROCEDURE — 97022 WHIRLPOOL THERAPY: CPT | Mod: PN

## 2020-12-15 PROCEDURE — 97110 THERAPEUTIC EXERCISES: CPT | Mod: PN

## 2020-12-15 NOTE — PROGRESS NOTES
Occupational Therapy Daily Treatment Note     Date: 12/15/2020  Name: Oliver Porras  Clinic Number: [id number]    Therapy Diagnosis:   Encounter Diagnoses   Name Primary?    Pain in joint of left hand Yes    Effusion of joint of left hand     Stiffness of left hand joint     Left hand weakness      Physician: Shaji Robb III,*      Physician Orders: evaluate and tx, see epic  Medical Diagnosis: left trigger thumb, OA; left trigger thumb release, proximal row carpectomy  Surgical Procedure and Date: see above, 10-22-20  Evaluation Date: 11/12/2020  Insurance Authorization Period Expiration: referral [id number] 11-11-20 thru 12-31-20  Plan of Care Certification Period: 11-12-20 thru 12-24-20  Date of Return to MD: 12-9-20    Visit # / Visits authorized: 9 / 20 referral [id number] 11- thru 12-31-20  Time In:1015  Time Out: 11  Total Billable Time: 45 minutes    Precautions:  universal    Subjective     Pt reports: no new issues  he is compliant with home exercise program.  Response to previous treatment:good  Functional change: light grasp with basic use continues to improve day to day  Pain: 0/10 rest; 3/10 light use  Location: diffuse      wrist ache  Objective       Timed units:  2 therapeutic exercise                            Time:1030-11    Untimed units:  fluidotherapy                                        Time:8065-0803    Measurements:     N/a    Fluido: n/a  U/s:n/a      UBE: level 1 x 10'    Scar massage: reviewed    Stretches as tolerated-pain free: thumb opp, df/pf, prayer, fist forward      Manual: n/a    ROM:  N/a    PRE: n/a      HEP: see above              Home Exercises and Education Provided     Education provided:     Different structures stretched with df/pf vs fist forward and prayer        progress towards goals   likely tx progression  rationale of rehab interventions    Written Home Exercises Provided: no    Previously issued exercises were reviewed if still part of current  tx plan as well as any issued today and Oliver was able to demonstrate them prior to the end of the session.  Oliver demonstrated good understanding of the HEP provided.     See EMR under patient instructions and/or media for HEP issued today or in past    Assessment       Overall edema, pain, and tightness continues to decrease      Pt would continue to benefit from skilled OT in order to facilitate improved functional use and protect surgical site.    Oliver is progressing well towards his goals and there are no updates to goals at this time. Pt prognosis is good  Pt will continue to benefit from skilled outpatient occupational therapy to address the deficits listed in the problem list on initial evaluation to provide pt/family education and to maximize pt's level of independence in the home and community environment.     Anticipated barriers to occupational therapy: edema, pain, scar, weakness, stiffness    Pt's spiritual, cultural and educational needs considered and pt agreeable to plan of care and goals.    Goals:  stg to be met in 2 weeks 1. Patient will be I with HEP 2. Patient will have 2/10 pain with light use 3. Patient will have = prom of bilateral digits to enhance affected arm use with ADL        ltg to be met by d/c 1. Patient will be I with d/c HEP 2. Patient will have 1/10 pain with all use 3. Patient will have about 75% /pinch  on affected side vs unaffected side to promote full functional use                                                                   4. Patient will be I with all ADL       all goals ongoing unless otherwise noted  stg to be met in 2 weeks 1. Patient will be I with HEP 2. Patient will have 2/10 pain with light use 3. Patient will have = prom of bilateral digits to enhance affected arm use with ADL        ltg to be met by d/c 1. Patient will be I with d/c HEP 2. Patient will have 1/10 pain with all use 3. Patient will have about 75% /pinch  on affected side vs  unaffected side to promote full functional use                                                                   4. Patient will be I with all ADL       all goals ongoing unless otherwise noted              Any goals met today ?  (if any met see above)    Updates/Grading for next session: prn    Plan: splint per protocol and md instruction, ROM, scar massage, edema control, PRE, patient education      Victor Hugo Palomares, OT/CHT

## 2020-12-17 ENCOUNTER — CLINICAL SUPPORT (OUTPATIENT)
Dept: REHABILITATION | Facility: HOSPITAL | Age: 60
End: 2020-12-17
Payer: COMMERCIAL

## 2020-12-17 DIAGNOSIS — M25.642 STIFFNESS OF LEFT HAND JOINT: ICD-10-CM

## 2020-12-17 DIAGNOSIS — M25.442 EFFUSION OF JOINT OF LEFT HAND: ICD-10-CM

## 2020-12-17 DIAGNOSIS — R29.898 LEFT HAND WEAKNESS: ICD-10-CM

## 2020-12-17 DIAGNOSIS — M25.542 PAIN IN JOINT OF LEFT HAND: Primary | ICD-10-CM

## 2020-12-17 PROCEDURE — 97110 THERAPEUTIC EXERCISES: CPT | Mod: PN

## 2020-12-17 PROCEDURE — 97022 WHIRLPOOL THERAPY: CPT | Mod: PN

## 2020-12-17 NOTE — PROGRESS NOTES
Occupational Therapy Daily Treatment Note     Date: 12/17/2020  Name: Oliver Porras  Clinic Number: [id number]    Therapy Diagnosis:   Encounter Diagnoses   Name Primary?    Pain in joint of left hand Yes    Effusion of joint of left hand     Stiffness of left hand joint     Left hand weakness      Physician: Shaji Robb III,*      Physician Orders: evaluate and tx, see epic  Medical Diagnosis: left trigger thumb, OA; left trigger thumb release, proximal row carpectomy  Surgical Procedure and Date: see above, 10-22-20  Evaluation Date: 11/12/2020  Insurance Authorization Period Expiration: referral [id number] 11-11-20 thru 12-31-20  Plan of Care Certification Period: 11-12-20 thru 12-24-20  Date of Return to MD: 12-9-20    Visit # / Visits authorized: 10 / 20 referral [id number] 11- thru 12-31-20  Time In:830  Time Out: 915  Total Billable Time: 45 minutes    Precautions:  universal    Subjective     Pt reports: no new issues  he is compliant with home exercise program.  Response to previous treatment:good  Functional change: light use with self care slowly increasing  Pain: 0/10 rest; 3/10 light use  Location: diffuse      wrist ache  Objective       Timed units:  2 therapeutic exercise                            Time:845-915    Untimed units:  fluidotherapy                                        Time:830-845    Measurements:     N/a    Fluido: n/a  U/s:n/a      UBE: level 1 x 10'    Scar massage: reviewed    Stretches as tolerated-pain free: thumb opp, df/pf, prayer, fist forward      Manual: n/a    ROM:  N/a    PRE: n/a      HEP: see above              Home Exercises and Education Provided     Education provided:     Explained next week using a heating pad applied pre HEP likely will be issued        progress towards goals   likely tx progression  rationale of rehab interventions    Written Home Exercises Provided: prayer and fist forward stretch    Previously issued exercises were reviewed if  still part of current tx plan as well as any issued today and Oliver was able to demonstrate them prior to the end of the session.  Oliver demonstrated good understanding of the HEP provided.     See EMR under patient instructions and/or media for HEP issued today or in past    Assessment       Gentle prolonged daily stretches should continue to improve wrist mobility although obtaining = prom vs unaffected side not probable given current anatomy of left wrist      Pt would continue to benefit from skilled OT in order to facilitate improved functional use and protect surgical site.    Oliver is progressing well towards his goals and there are no updates to goals at this time. Pt prognosis is good  Pt will continue to benefit from skilled outpatient occupational therapy to address the deficits listed in the problem list on initial evaluation to provide pt/family education and to maximize pt's level of independence in the home and community environment.     Anticipated barriers to occupational therapy: edema, pain, scar, weakness, stiffness    Pt's spiritual, cultural and educational needs considered and pt agreeable to plan of care and goals.    Goals:  stg to be met in 2 weeks 1. Patient will be I with HEP 2. Patient will have 2/10 pain with light use 3. Patient will have = prom of bilateral digits to enhance affected arm use with ADL        ltg to be met by d/c 1. Patient will be I with d/c HEP 2. Patient will have 1/10 pain with all use 3. Patient will have about 75% /pinch  on affected side vs unaffected side to promote full functional use                                                                   4. Patient will be I with all ADL       all goals ongoing unless otherwise noted  stg to be met in 2 weeks 1. Patient will be I with HEP 2. Patient will have 2/10 pain with light use 3. Patient will have = prom of bilateral digits to enhance affected arm use with ADL        ltg to be met by d/c 1. Patient  will be I with d/c HEP 2. Patient will have 1/10 pain with all use 3. Patient will have about 75% /pinch  on affected side vs unaffected side to promote full functional use                                                                   4. Patient will be I with all ADL       all goals ongoing unless otherwise noted              Any goals met today ?  (if any met see above)    Updates/Grading for next session: prn, consider easing into  and pinch PRE    Plan: splint per protocol and md instruction, ROM, scar massage, edema control, PRE, patient education      Victor Hugo Palomares, OT/CHT

## 2020-12-22 ENCOUNTER — CLINICAL SUPPORT (OUTPATIENT)
Dept: REHABILITATION | Facility: HOSPITAL | Age: 60
End: 2020-12-22
Payer: COMMERCIAL

## 2020-12-22 DIAGNOSIS — M25.442 EFFUSION OF JOINT OF LEFT HAND: ICD-10-CM

## 2020-12-22 DIAGNOSIS — M25.542 PAIN IN JOINT OF LEFT HAND: Primary | ICD-10-CM

## 2020-12-22 DIAGNOSIS — M25.642 STIFFNESS OF LEFT HAND JOINT: ICD-10-CM

## 2020-12-22 DIAGNOSIS — R29.898 LEFT HAND WEAKNESS: ICD-10-CM

## 2020-12-22 PROCEDURE — 97110 THERAPEUTIC EXERCISES: CPT | Mod: PN

## 2020-12-22 PROCEDURE — 97022 WHIRLPOOL THERAPY: CPT | Mod: PN

## 2020-12-22 NOTE — PROGRESS NOTES
Occupational Therapy Daily Treatment Note     Date: 12/22/2020  Name: Oliver Porras  Clinic Number: [id number]    Therapy Diagnosis:   Encounter Diagnoses   Name Primary?    Pain in joint of left hand Yes    Effusion of joint of left hand     Stiffness of left hand joint     Left hand weakness      Physician: Shaji Robb III,*      Physician Orders: evaluate and tx, see epic  Medical Diagnosis: left trigger thumb, OA; left trigger thumb release, proximal row carpectomy  Surgical Procedure and Date: see above, 10-22-20  Evaluation Date: 11/12/2020  Insurance Authorization Period Expiration: referral [id number] 11-11-20 thru 12-31-20  Plan of Care Certification Period: 11-12-20 thru 12-24-20  Date of Return to MD: 12-9-20    Visit # / Visits authorized: 11 / 20 referral [id number] 11- thru 12-31-20  Time In:930  Time Out: 1015  Total Billable Time: 45 minutes    Precautions:  universal    Subjective     Pt reports: no new issues  he is compliant with home exercise program.  Response to previous treatment:good  Functional change: grasp with light tasks continues to improve  Pain: 0/10 rest; 3/10 light use  Location: diffuse      wrist ache  Objective       Timed units:  2 therapeutic exercise                            Time:945-1015    Untimed units:  fluidotherapy                                        Time:930-945    Measurements:     N/a    Fluido: n/a  U/s:n/a      UBE: level 1 x 10'    Scar massage: reviewed    Stretches as tolerated-pain free: thumb opp, df/pf, prayer, fist forward      Manual: n/a    ROM:  N/a    PRE:   Light grippers x 10  Tan putty 30 each: key, 3 jaw, tip, cone, small splint stick push/pull      HEP: see above              Home Exercises and Education Provided     Education provided:     Explained to continues light painless nonrepetitive use only        progress towards goals   likely tx progression  rationale of rehab interventions    Written Home Exercises Provided:  no    Previously issued exercises were reviewed if still part of current tx plan as well as any issued today and Oliver was able to demonstrate them prior to the end of the session.  Oliver demonstrated good understanding of the HEP provided.     See EMR under patient instructions and/or media for HEP issued today or in past    Assessment       Considering date of onset of symptoms, date of surgery, and date rehab started, progress is satisfactory        Pt would continue to benefit from skilled OT in order to facilitate improved functional use and protect surgical site.    Oliver is progressing well towards his goals and there are no updates to goals at this time. Pt prognosis is good  Pt will continue to benefit from skilled outpatient occupational therapy to address the deficits listed in the problem list on initial evaluation to provide pt/family education and to maximize pt's level of independence in the home and community environment.     Anticipated barriers to occupational therapy: edema, pain, scar, weakness, stiffness    Pt's spiritual, cultural and educational needs considered and pt agreeable to plan of care and goals.    Goals:  stg to be met in 2 weeks 1. Patient will be I with HEP 2. Patient will have 2/10 pain with light use 3. Patient will have = prom of bilateral digits to enhance affected arm use with ADL        ltg to be met by d/c 1. Patient will be I with d/c HEP 2. Patient will have 1/10 pain with all use 3. Patient will have about 75% /pinch  on affected side vs unaffected side to promote full functional use                                                                   4. Patient will be I with all ADL       all goals ongoing unless otherwise noted  stg to be met in 2 weeks 1. Patient will be I with HEP 2. Patient will have 2/10 pain with light use 3. Patient will have = prom of bilateral digits to enhance affected arm use with ADL        ltg to be met by d/c 1. Patient will be I  with d/c HEP 2. Patient will have 1/10 pain with all use 3. Patient will have about 75% /pinch  on affected side vs unaffected side to promote full functional use                                                                   4. Patient will be I with all ADL       all goals ongoing unless otherwise noted              Any goals met today ?  (if any met see above)    Updates/Grading for next session: prn, consider  and pinch testing    Plan: splint per protocol and md instruction, ROM, scar massage, edema control, PRE, patient education      Victor Hugo Palomares, OT/CHT

## 2020-12-29 ENCOUNTER — CLINICAL SUPPORT (OUTPATIENT)
Dept: REHABILITATION | Facility: HOSPITAL | Age: 60
End: 2020-12-29
Payer: COMMERCIAL

## 2020-12-29 DIAGNOSIS — R29.898 LEFT HAND WEAKNESS: ICD-10-CM

## 2020-12-29 DIAGNOSIS — M25.642 STIFFNESS OF LEFT HAND JOINT: ICD-10-CM

## 2020-12-29 DIAGNOSIS — M25.442 EFFUSION OF JOINT OF LEFT HAND: ICD-10-CM

## 2020-12-29 DIAGNOSIS — M25.542 PAIN IN JOINT OF LEFT HAND: Primary | ICD-10-CM

## 2020-12-29 PROCEDURE — 97110 THERAPEUTIC EXERCISES: CPT | Mod: PN

## 2020-12-29 PROCEDURE — 97022 WHIRLPOOL THERAPY: CPT | Mod: PN

## 2020-12-29 NOTE — PROGRESS NOTES
Occupational Therapy Daily Treatment Note     Date: 12/29/2020  Name: Oliver Porras  Clinic Number: [id number]    Therapy Diagnosis:   Encounter Diagnoses   Name Primary?    Pain in joint of left hand Yes    Effusion of joint of left hand     Stiffness of left hand joint     Left hand weakness      Physician: Shaji Robb III,*      Physician Orders: evaluate and tx, see epic  Medical Diagnosis: left trigger thumb, OA; left trigger thumb release, proximal row carpectomy  Surgical Procedure and Date: see above, 10-22-20  Evaluation Date: 11/12/2020  Insurance Authorization Period Expiration: referral [id number] 11-11-20 thru 12-31-20  Plan of Care Certification Period: 12-29-20 thru 2-23-21  Date of Return to MD: 12-9-20    Visit # / Visits authorized: 12 / 20 referral [id number] 11- thru 12-31-20  Time In:930  Time Out: 1015  Total Billable Time: 45 minutes    Precautions:  universal    Subjective     Pt reports: no new issues  he is compliant with home exercise program.  Response to previous treatment:good  Functional change: grasp with light tasks continues to improve day to day  Pain: 0/10 rest; 3/10 light use  Location: diffuse      wrist ache  Objective       Timed units:  2 therapeutic exercise                            Time:945-1015    Untimed units:  fluidotherapy                                        Time:930-945    Measurements:     N/a    Fluido: n/a  U/s:n/a      UBE: level 1 x 10'    Scar massage: reviewed    Stretches as tolerated-pain free: n/a    Manual: n/a    ROM:  N/a    PRE:   Light grippers 2 x 10  yellow putty 30 each: key, 3 jaw, tip, cone, small splint stick push/pull  Green putty small splint stick pushdowns green 2 pancakes    HEP: see above              Home Exercises and Education Provided     Education provided:     Explained typical ROM of wrist post his surgery and proper use of heating pad pre stretches at home      progress towards goals   likely tx  progression  rationale of rehab interventions    Written Home Exercises Provided: no    Previously issued exercises were reviewed if still part of current tx plan as well as any issued today and Oliver was able to demonstrate them prior to the end of the session.  Oliver demonstrated good understanding of the HEP provided.     See EMR under patient instructions and/or media for HEP issued today or in past    Assessment       Scar with good pliability,  and pinch PRE should help to promote increased strength with ADL        Pt would continue to benefit from skilled OT in order to facilitate improved functional use and protect surgical site.    Oliver is progressing well towards his goals and there are no updates to goals at this time. Pt prognosis is good  Pt will continue to benefit from skilled outpatient occupational therapy to address the deficits listed in the problem list on initial evaluation to provide pt/family education and to maximize pt's level of independence in the home and community environment.     Anticipated barriers to occupational therapy: edema, pain, scar, weakness, stiffness    Pt's spiritual, cultural and educational needs considered and pt agreeable to plan of care and goals.    Goals:  stg to be met in 2 weeks 1. Patient will be I with HEP 2. Patient will have 2/10 pain with light use 3. Patient will have = prom of bilateral digits to enhance affected arm use with ADL        ltg to be met by d/c 1. Patient will be I with d/c HEP 2. Patient will have 1/10 pain with all use 3. Patient will have about 75% /pinch  on affected side vs unaffected side to promote full functional use                                                                   4. Patient will be I with all ADL       all goals ongoing unless otherwise noted  stg to be met in 2 weeks 1. Patient will be I with HEP 2. Patient will have 2/10 pain with light use 3. Patient will have = prom of bilateral digits to enhance  affected arm use with ADL        ltg to be met by d/c 1. Patient will be I with d/c HEP 2. Patient will have 1/10 pain with all use 3. Patient will have about 75% /pinch  on affected side vs unaffected side to promote full functional use                                                                   4. Patient will be I with all ADL       all goals ongoing unless otherwise noted              Any goals met today ?  (if any met see above)    Updates/Grading for next session: prn, consider  and pinch testing    Plan: splint per protocol and md instruction, ROM, scar massage, edema control, PRE, patient education      Victor Hugo Palomares, OT/CHT

## 2020-12-29 NOTE — PLAN OF CARE
Outpatient Therapy Updated Plan of Care     Visit Date: 12/29/2020  Name: Oliver Porras  Clinic Number: [id number]    Therapy Diagnosis:   Encounter Diagnoses   Name Primary?    Pain in joint of left hand Yes    Effusion of joint of left hand     Stiffness of left hand joint     Left hand weakness      Physician: Shaji Robb III,*    Physician Orders: evaluate and tx, see media  Medical Diagnosis: see evaluation  Evaluation Date: 11-12-20    Total Visits Received: 13  Cancelled Visits: see epic  No Show Visits: see epic    Current Certification Period:  11-12-20 to 12-24-20  Precautions:  Universal, protocol  Visits from Evaluation Date:  12  Functional Level Prior to Evaluation:  Decreased rom, use, and strength; pre symptom onset had no deficits with functional use    Subjective     Update: happy with progress    Objective     Update: see notes for measurements    Assessment     Update: pain and stiffness continues to decrease, light use with ADL continues to improve    Previous Short Term Goals Status:   See notes  New Short Term Goals Status:   n/a  Long Term Goal Status:   continue per initial plan of care.  Continue any LTGs added in notes post initial eval  Reasons for Recertification of Therapy:   Continued skilled and structured rehab imperative to properly promote full rom, balanced strength, and good mechanics long term with ADL    Plan     Updated Certification Period: 12/29/2020 to 2-23-21  Recommended Treatment Plan: 2 times per week for 6 weeks: evaluate and tx      Victor Hugo Palomares OT/CHT  12/29/2020    frequency per week may change based on patient progress and need for therapy      I CERTIFY THE NEED FOR THESE SERVICES FURNISHED UNDER THIS PLAN OF TREATMENT AND WHILE UNDER MY CARE    Physician's comments:        Physician's Signature: ___________________________________________________

## 2021-01-05 ENCOUNTER — CLINICAL SUPPORT (OUTPATIENT)
Dept: REHABILITATION | Facility: HOSPITAL | Age: 61
End: 2021-01-05
Payer: COMMERCIAL

## 2021-01-05 DIAGNOSIS — M25.542 PAIN IN JOINT OF LEFT HAND: Primary | ICD-10-CM

## 2021-01-05 DIAGNOSIS — R29.898 LEFT HAND WEAKNESS: ICD-10-CM

## 2021-01-05 DIAGNOSIS — M25.642 STIFFNESS OF LEFT HAND JOINT: ICD-10-CM

## 2021-01-05 DIAGNOSIS — M25.442 EFFUSION OF JOINT OF LEFT HAND: ICD-10-CM

## 2021-01-05 PROCEDURE — 97110 THERAPEUTIC EXERCISES: CPT | Mod: PN

## 2021-01-07 ENCOUNTER — CLINICAL SUPPORT (OUTPATIENT)
Dept: REHABILITATION | Facility: HOSPITAL | Age: 61
End: 2021-01-07
Payer: COMMERCIAL

## 2021-01-07 DIAGNOSIS — M25.442 EFFUSION OF JOINT OF LEFT HAND: ICD-10-CM

## 2021-01-07 DIAGNOSIS — M25.642 STIFFNESS OF LEFT HAND JOINT: ICD-10-CM

## 2021-01-07 DIAGNOSIS — M25.542 PAIN IN JOINT OF LEFT HAND: Primary | ICD-10-CM

## 2021-01-07 DIAGNOSIS — R29.898 LEFT HAND WEAKNESS: ICD-10-CM

## 2021-01-07 PROCEDURE — 97110 THERAPEUTIC EXERCISES: CPT | Mod: PN

## 2021-01-07 PROCEDURE — 97022 WHIRLPOOL THERAPY: CPT | Mod: PN

## 2021-01-12 ENCOUNTER — CLINICAL SUPPORT (OUTPATIENT)
Dept: REHABILITATION | Facility: HOSPITAL | Age: 61
End: 2021-01-12
Payer: COMMERCIAL

## 2021-01-12 DIAGNOSIS — M25.642 STIFFNESS OF LEFT HAND JOINT: ICD-10-CM

## 2021-01-12 DIAGNOSIS — R29.898 LEFT HAND WEAKNESS: ICD-10-CM

## 2021-01-12 DIAGNOSIS — M25.542 PAIN IN JOINT OF LEFT HAND: Primary | ICD-10-CM

## 2021-01-12 DIAGNOSIS — M25.442 EFFUSION OF JOINT OF LEFT HAND: ICD-10-CM

## 2021-01-12 PROCEDURE — 97022 WHIRLPOOL THERAPY: CPT | Mod: PN

## 2021-01-12 PROCEDURE — 97110 THERAPEUTIC EXERCISES: CPT | Mod: PN

## 2021-01-14 ENCOUNTER — CLINICAL SUPPORT (OUTPATIENT)
Dept: REHABILITATION | Facility: HOSPITAL | Age: 61
End: 2021-01-14
Payer: COMMERCIAL

## 2021-01-14 DIAGNOSIS — M25.442 EFFUSION OF JOINT OF LEFT HAND: ICD-10-CM

## 2021-01-14 DIAGNOSIS — M25.642 STIFFNESS OF LEFT HAND JOINT: ICD-10-CM

## 2021-01-14 DIAGNOSIS — M25.542 PAIN IN JOINT OF LEFT HAND: Primary | ICD-10-CM

## 2021-01-14 DIAGNOSIS — R29.898 LEFT HAND WEAKNESS: ICD-10-CM

## 2021-01-14 PROCEDURE — 97110 THERAPEUTIC EXERCISES: CPT | Mod: PN

## 2021-01-14 PROCEDURE — 97022 WHIRLPOOL THERAPY: CPT | Mod: PN

## 2021-01-19 ENCOUNTER — CLINICAL SUPPORT (OUTPATIENT)
Dept: REHABILITATION | Facility: HOSPITAL | Age: 61
End: 2021-01-19
Payer: COMMERCIAL

## 2021-01-19 DIAGNOSIS — M25.542 PAIN IN JOINT OF LEFT HAND: Primary | ICD-10-CM

## 2021-01-19 DIAGNOSIS — R29.898 LEFT HAND WEAKNESS: ICD-10-CM

## 2021-01-19 DIAGNOSIS — M25.442 EFFUSION OF JOINT OF LEFT HAND: ICD-10-CM

## 2021-01-19 DIAGNOSIS — M25.642 STIFFNESS OF LEFT HAND JOINT: ICD-10-CM

## 2021-01-19 PROCEDURE — 97022 WHIRLPOOL THERAPY: CPT | Mod: PN

## 2021-01-19 PROCEDURE — 97110 THERAPEUTIC EXERCISES: CPT | Mod: PN

## 2021-01-21 ENCOUNTER — CLINICAL SUPPORT (OUTPATIENT)
Dept: REHABILITATION | Facility: HOSPITAL | Age: 61
End: 2021-01-21
Payer: COMMERCIAL

## 2021-01-21 DIAGNOSIS — M25.642 STIFFNESS OF LEFT HAND JOINT: ICD-10-CM

## 2021-01-21 DIAGNOSIS — M25.442 EFFUSION OF JOINT OF LEFT HAND: ICD-10-CM

## 2021-01-21 DIAGNOSIS — R29.898 LEFT HAND WEAKNESS: ICD-10-CM

## 2021-01-21 DIAGNOSIS — M25.542 PAIN IN JOINT OF LEFT HAND: Primary | ICD-10-CM

## 2021-01-21 PROCEDURE — 97110 THERAPEUTIC EXERCISES: CPT | Mod: PN

## 2021-01-21 PROCEDURE — 97022 WHIRLPOOL THERAPY: CPT | Mod: PN

## 2021-01-26 ENCOUNTER — CLINICAL SUPPORT (OUTPATIENT)
Dept: REHABILITATION | Facility: HOSPITAL | Age: 61
End: 2021-01-26
Payer: COMMERCIAL

## 2021-01-26 DIAGNOSIS — M25.542 PAIN IN JOINT OF LEFT HAND: Primary | ICD-10-CM

## 2021-01-26 DIAGNOSIS — M25.642 STIFFNESS OF LEFT HAND JOINT: ICD-10-CM

## 2021-01-26 DIAGNOSIS — R29.898 LEFT HAND WEAKNESS: ICD-10-CM

## 2021-01-26 DIAGNOSIS — M25.442 EFFUSION OF JOINT OF LEFT HAND: ICD-10-CM

## 2021-01-26 PROCEDURE — 97022 WHIRLPOOL THERAPY: CPT | Mod: PN

## 2021-01-26 PROCEDURE — 97110 THERAPEUTIC EXERCISES: CPT | Mod: PN

## 2021-02-02 ENCOUNTER — DOCUMENTATION ONLY (OUTPATIENT)
Dept: REHABILITATION | Facility: HOSPITAL | Age: 61
End: 2021-02-02

## 2021-02-03 DIAGNOSIS — M19.032: ICD-10-CM

## 2021-02-03 DIAGNOSIS — M65.312 TRIGGER THUMB OF LEFT HAND: Primary | ICD-10-CM

## 2021-02-08 ENCOUNTER — CLINICAL SUPPORT (OUTPATIENT)
Dept: REHABILITATION | Facility: HOSPITAL | Age: 61
End: 2021-02-08
Payer: COMMERCIAL

## 2021-02-08 ENCOUNTER — TELEPHONE (OUTPATIENT)
Dept: REHABILITATION | Facility: HOSPITAL | Age: 61
End: 2021-02-08

## 2021-02-08 DIAGNOSIS — M25.642 STIFFNESS OF LEFT HAND JOINT: ICD-10-CM

## 2021-02-08 DIAGNOSIS — R29.898 LEFT HAND WEAKNESS: ICD-10-CM

## 2021-02-08 DIAGNOSIS — M25.442 EFFUSION OF JOINT OF LEFT HAND: ICD-10-CM

## 2021-02-08 DIAGNOSIS — M25.542 PAIN IN JOINT OF LEFT HAND: Primary | ICD-10-CM

## 2021-02-08 PROCEDURE — 97110 THERAPEUTIC EXERCISES: CPT | Mod: PN

## 2021-02-08 PROCEDURE — 97022 WHIRLPOOL THERAPY: CPT | Mod: PN

## 2021-02-23 ENCOUNTER — CLINICAL SUPPORT (OUTPATIENT)
Dept: REHABILITATION | Facility: HOSPITAL | Age: 61
End: 2021-02-23
Payer: COMMERCIAL

## 2021-02-23 DIAGNOSIS — M25.542 PAIN IN JOINT OF LEFT HAND: Primary | ICD-10-CM

## 2021-02-23 DIAGNOSIS — R29.898 LEFT HAND WEAKNESS: ICD-10-CM

## 2021-02-23 DIAGNOSIS — M25.642 STIFFNESS OF LEFT HAND JOINT: ICD-10-CM

## 2021-02-23 DIAGNOSIS — M25.442 EFFUSION OF JOINT OF LEFT HAND: ICD-10-CM

## 2021-02-23 PROCEDURE — 97110 THERAPEUTIC EXERCISES: CPT | Mod: PN

## 2021-02-23 PROCEDURE — 97022 WHIRLPOOL THERAPY: CPT | Mod: PN

## 2021-03-02 ENCOUNTER — CLINICAL SUPPORT (OUTPATIENT)
Dept: REHABILITATION | Facility: HOSPITAL | Age: 61
End: 2021-03-02
Payer: COMMERCIAL

## 2021-03-02 DIAGNOSIS — M25.442 EFFUSION OF JOINT OF LEFT HAND: ICD-10-CM

## 2021-03-02 DIAGNOSIS — R29.898 LEFT HAND WEAKNESS: ICD-10-CM

## 2021-03-02 DIAGNOSIS — M25.542 PAIN IN JOINT OF LEFT HAND: Primary | ICD-10-CM

## 2021-03-02 DIAGNOSIS — M25.642 STIFFNESS OF LEFT HAND JOINT: ICD-10-CM

## 2021-03-02 PROCEDURE — 97110 THERAPEUTIC EXERCISES: CPT | Mod: PN

## 2021-03-02 PROCEDURE — 97022 WHIRLPOOL THERAPY: CPT | Mod: PN

## 2021-03-11 DIAGNOSIS — M19.032: ICD-10-CM

## 2021-03-11 DIAGNOSIS — M65.312 TRIGGER THUMB OF LEFT HAND: Primary | ICD-10-CM

## 2021-03-18 ENCOUNTER — CLINICAL SUPPORT (OUTPATIENT)
Dept: REHABILITATION | Facility: HOSPITAL | Age: 61
End: 2021-03-18
Payer: COMMERCIAL

## 2021-03-18 DIAGNOSIS — M25.642 STIFFNESS OF LEFT HAND JOINT: ICD-10-CM

## 2021-03-18 DIAGNOSIS — L90.5 SCAR ADHERENT: ICD-10-CM

## 2021-03-18 DIAGNOSIS — R29.898 LEFT HAND WEAKNESS: ICD-10-CM

## 2021-03-18 DIAGNOSIS — M25.542 PAIN IN JOINT OF LEFT HAND: Primary | ICD-10-CM

## 2021-03-18 DIAGNOSIS — M25.442 EFFUSION OF JOINT OF LEFT HAND: ICD-10-CM

## 2021-03-18 PROCEDURE — 97022 WHIRLPOOL THERAPY: CPT | Mod: PN

## 2021-03-18 PROCEDURE — 97035 APP MDLTY 1+ULTRASOUND EA 15: CPT | Mod: PN

## 2021-03-18 PROCEDURE — 97140 MANUAL THERAPY 1/> REGIONS: CPT | Mod: PN

## 2021-03-30 ENCOUNTER — CLINICAL SUPPORT (OUTPATIENT)
Dept: REHABILITATION | Facility: HOSPITAL | Age: 61
End: 2021-03-30
Payer: COMMERCIAL

## 2021-03-30 DIAGNOSIS — R29.898 LEFT HAND WEAKNESS: ICD-10-CM

## 2021-03-30 DIAGNOSIS — M25.642 STIFFNESS OF LEFT HAND JOINT: ICD-10-CM

## 2021-03-30 DIAGNOSIS — L90.5 SCAR ADHERENT: ICD-10-CM

## 2021-03-30 PROCEDURE — 97022 WHIRLPOOL THERAPY: CPT | Mod: PN

## 2021-03-30 PROCEDURE — 97110 THERAPEUTIC EXERCISES: CPT | Mod: PN

## 2021-03-30 PROCEDURE — 97035 APP MDLTY 1+ULTRASOUND EA 15: CPT | Mod: PN

## 2021-04-06 ENCOUNTER — CLINICAL SUPPORT (OUTPATIENT)
Dept: REHABILITATION | Facility: HOSPITAL | Age: 61
End: 2021-04-06
Payer: COMMERCIAL

## 2021-04-06 DIAGNOSIS — M25.642 STIFFNESS OF LEFT HAND JOINT: Primary | ICD-10-CM

## 2021-04-06 DIAGNOSIS — R29.898 LEFT HAND WEAKNESS: ICD-10-CM

## 2021-04-06 DIAGNOSIS — M25.442 EFFUSION OF JOINT OF LEFT HAND: ICD-10-CM

## 2021-04-06 DIAGNOSIS — L90.5 SCAR ADHERENT: ICD-10-CM

## 2021-04-06 DIAGNOSIS — M25.542 PAIN IN JOINT OF LEFT HAND: ICD-10-CM

## 2021-04-06 PROCEDURE — 97110 THERAPEUTIC EXERCISES: CPT | Mod: PN

## 2021-04-06 PROCEDURE — 97022 WHIRLPOOL THERAPY: CPT | Mod: PN

## 2021-04-06 PROCEDURE — 97035 APP MDLTY 1+ULTRASOUND EA 15: CPT | Mod: PN

## 2021-04-13 ENCOUNTER — CLINICAL SUPPORT (OUTPATIENT)
Dept: REHABILITATION | Facility: HOSPITAL | Age: 61
End: 2021-04-13
Payer: COMMERCIAL

## 2021-04-13 DIAGNOSIS — M25.542 PAIN IN JOINT OF LEFT HAND: ICD-10-CM

## 2021-04-13 DIAGNOSIS — M25.642 STIFFNESS OF LEFT HAND JOINT: Primary | ICD-10-CM

## 2021-04-13 DIAGNOSIS — M25.442 EFFUSION OF JOINT OF LEFT HAND: ICD-10-CM

## 2021-04-13 DIAGNOSIS — L90.5 SCAR ADHERENT: ICD-10-CM

## 2021-04-13 DIAGNOSIS — R29.898 LEFT HAND WEAKNESS: ICD-10-CM

## 2021-04-13 PROCEDURE — 97022 WHIRLPOOL THERAPY: CPT | Mod: PN

## 2021-04-13 PROCEDURE — 97110 THERAPEUTIC EXERCISES: CPT | Mod: PN

## 2021-04-13 PROCEDURE — 97035 APP MDLTY 1+ULTRASOUND EA 15: CPT | Mod: PN

## 2021-04-15 ENCOUNTER — CLINICAL SUPPORT (OUTPATIENT)
Dept: REHABILITATION | Facility: HOSPITAL | Age: 61
End: 2021-04-15
Payer: COMMERCIAL

## 2021-04-15 DIAGNOSIS — M25.442 EFFUSION OF JOINT OF LEFT HAND: ICD-10-CM

## 2021-04-15 DIAGNOSIS — R29.898 LEFT HAND WEAKNESS: ICD-10-CM

## 2021-04-15 DIAGNOSIS — M25.542 PAIN IN JOINT OF LEFT HAND: ICD-10-CM

## 2021-04-15 DIAGNOSIS — L90.5 SCAR ADHERENT: ICD-10-CM

## 2021-04-15 DIAGNOSIS — M25.642 STIFFNESS OF LEFT HAND JOINT: Primary | ICD-10-CM

## 2021-04-15 PROCEDURE — 97110 THERAPEUTIC EXERCISES: CPT | Mod: PN

## 2021-04-15 PROCEDURE — 97035 APP MDLTY 1+ULTRASOUND EA 15: CPT | Mod: PN

## 2021-04-15 PROCEDURE — 97022 WHIRLPOOL THERAPY: CPT | Mod: PN

## 2021-04-20 ENCOUNTER — CLINICAL SUPPORT (OUTPATIENT)
Dept: REHABILITATION | Facility: HOSPITAL | Age: 61
End: 2021-04-20
Payer: COMMERCIAL

## 2021-04-20 DIAGNOSIS — M25.442 EFFUSION OF JOINT OF LEFT HAND: ICD-10-CM

## 2021-04-20 DIAGNOSIS — M25.642 STIFFNESS OF LEFT HAND JOINT: Primary | ICD-10-CM

## 2021-04-20 DIAGNOSIS — R29.898 LEFT HAND WEAKNESS: ICD-10-CM

## 2021-04-20 DIAGNOSIS — M25.542 PAIN IN JOINT OF LEFT HAND: ICD-10-CM

## 2021-04-20 DIAGNOSIS — L90.5 SCAR ADHERENT: ICD-10-CM

## 2021-04-20 PROCEDURE — 97035 APP MDLTY 1+ULTRASOUND EA 15: CPT | Mod: PN

## 2021-04-20 PROCEDURE — 97022 WHIRLPOOL THERAPY: CPT | Mod: PN

## 2021-04-20 PROCEDURE — 97110 THERAPEUTIC EXERCISES: CPT | Mod: PN

## 2021-04-22 ENCOUNTER — CLINICAL SUPPORT (OUTPATIENT)
Dept: REHABILITATION | Facility: HOSPITAL | Age: 61
End: 2021-04-22
Payer: COMMERCIAL

## 2021-04-22 DIAGNOSIS — L90.5 SCAR ADHERENT: ICD-10-CM

## 2021-04-22 DIAGNOSIS — R29.898 LEFT HAND WEAKNESS: ICD-10-CM

## 2021-04-22 DIAGNOSIS — M25.442 EFFUSION OF JOINT OF LEFT HAND: ICD-10-CM

## 2021-04-22 DIAGNOSIS — M25.642 STIFFNESS OF LEFT HAND JOINT: Primary | ICD-10-CM

## 2021-04-22 DIAGNOSIS — M25.542 PAIN IN JOINT OF LEFT HAND: ICD-10-CM

## 2021-04-22 PROCEDURE — 97022 WHIRLPOOL THERAPY: CPT | Mod: PN

## 2021-04-22 PROCEDURE — 97035 APP MDLTY 1+ULTRASOUND EA 15: CPT | Mod: PN

## 2021-04-22 PROCEDURE — 97110 THERAPEUTIC EXERCISES: CPT | Mod: PN

## 2021-04-27 ENCOUNTER — CLINICAL SUPPORT (OUTPATIENT)
Dept: REHABILITATION | Facility: HOSPITAL | Age: 61
End: 2021-04-27
Payer: COMMERCIAL

## 2021-04-27 DIAGNOSIS — M25.642 STIFFNESS OF LEFT HAND JOINT: Primary | ICD-10-CM

## 2021-04-27 DIAGNOSIS — L90.5 SCAR ADHERENT: ICD-10-CM

## 2021-04-27 DIAGNOSIS — R29.898 LEFT HAND WEAKNESS: ICD-10-CM

## 2021-04-27 PROCEDURE — 97022 WHIRLPOOL THERAPY: CPT | Mod: PN

## 2021-04-27 PROCEDURE — 97035 APP MDLTY 1+ULTRASOUND EA 15: CPT | Mod: PN

## 2021-04-27 PROCEDURE — 97110 THERAPEUTIC EXERCISES: CPT | Mod: PN

## 2021-04-29 ENCOUNTER — PATIENT MESSAGE (OUTPATIENT)
Dept: RESEARCH | Facility: HOSPITAL | Age: 61
End: 2021-04-29

## 2021-04-30 ENCOUNTER — DOCUMENTATION ONLY (OUTPATIENT)
Dept: REHABILITATION | Facility: HOSPITAL | Age: 61
End: 2021-04-30

## 2021-09-20 DIAGNOSIS — S66.812A: Primary | ICD-10-CM

## 2021-10-21 ENCOUNTER — CLINICAL SUPPORT (OUTPATIENT)
Dept: REHABILITATION | Facility: HOSPITAL | Age: 61
End: 2021-10-21
Payer: COMMERCIAL

## 2021-10-21 DIAGNOSIS — M25.442 EFFUSION OF JOINT OF LEFT HAND: ICD-10-CM

## 2021-10-21 DIAGNOSIS — S66.812A: ICD-10-CM

## 2021-10-21 DIAGNOSIS — M25.642 STIFFNESS OF LEFT HAND JOINT: Primary | ICD-10-CM

## 2021-10-21 DIAGNOSIS — R29.898 LEFT HAND WEAKNESS: ICD-10-CM

## 2021-10-21 PROCEDURE — 97165 OT EVAL LOW COMPLEX 30 MIN: CPT | Mod: PN,GO

## 2021-10-26 ENCOUNTER — CLINICAL SUPPORT (OUTPATIENT)
Dept: REHABILITATION | Facility: HOSPITAL | Age: 61
End: 2021-10-26
Payer: COMMERCIAL

## 2021-10-26 DIAGNOSIS — M25.642 STIFFNESS OF LEFT HAND JOINT: Primary | ICD-10-CM

## 2021-10-26 DIAGNOSIS — R29.898 LEFT HAND WEAKNESS: ICD-10-CM

## 2021-10-26 PROCEDURE — 97022 WHIRLPOOL THERAPY: CPT | Mod: PN

## 2021-10-26 PROCEDURE — 97110 THERAPEUTIC EXERCISES: CPT | Mod: PN,GO

## 2021-10-29 ENCOUNTER — DOCUMENTATION ONLY (OUTPATIENT)
Dept: REHABILITATION | Facility: HOSPITAL | Age: 61
End: 2021-10-29
Payer: COMMERCIAL

## 2021-11-09 ENCOUNTER — OFFICE VISIT (OUTPATIENT)
Dept: URGENT CARE | Facility: CLINIC | Age: 61
End: 2021-11-09
Payer: COMMERCIAL

## 2021-11-09 VITALS
HEIGHT: 67 IN | DIASTOLIC BLOOD PRESSURE: 91 MMHG | OXYGEN SATURATION: 97 % | RESPIRATION RATE: 16 BRPM | SYSTOLIC BLOOD PRESSURE: 152 MMHG | WEIGHT: 220 LBS | TEMPERATURE: 97 F | HEART RATE: 73 BPM | BODY MASS INDEX: 34.53 KG/M2

## 2021-11-09 DIAGNOSIS — H60.502 ACUTE OTITIS EXTERNA OF LEFT EAR, UNSPECIFIED TYPE: Primary | ICD-10-CM

## 2021-11-09 PROCEDURE — 99204 PR OFFICE/OUTPT VISIT, NEW, LEVL IV, 45-59 MIN: ICD-10-PCS | Mod: S$GLB,,, | Performed by: NURSE PRACTITIONER

## 2021-11-09 PROCEDURE — 3080F DIAST BP >= 90 MM HG: CPT | Mod: CPTII,S$GLB,, | Performed by: NURSE PRACTITIONER

## 2021-11-09 PROCEDURE — 3077F PR MOST RECENT SYSTOLIC BLOOD PRESSURE >= 140 MM HG: ICD-10-PCS | Mod: CPTII,S$GLB,, | Performed by: NURSE PRACTITIONER

## 2021-11-09 PROCEDURE — 3077F SYST BP >= 140 MM HG: CPT | Mod: CPTII,S$GLB,, | Performed by: NURSE PRACTITIONER

## 2021-11-09 PROCEDURE — 1159F PR MEDICATION LIST DOCUMENTED IN MEDICAL RECORD: ICD-10-PCS | Mod: CPTII,S$GLB,, | Performed by: NURSE PRACTITIONER

## 2021-11-09 PROCEDURE — 3008F BODY MASS INDEX DOCD: CPT | Mod: CPTII,S$GLB,, | Performed by: NURSE PRACTITIONER

## 2021-11-09 PROCEDURE — 1160F RVW MEDS BY RX/DR IN RCRD: CPT | Mod: CPTII,S$GLB,, | Performed by: NURSE PRACTITIONER

## 2021-11-09 PROCEDURE — 1160F PR REVIEW ALL MEDS BY PRESCRIBER/CLIN PHARMACIST DOCUMENTED: ICD-10-PCS | Mod: CPTII,S$GLB,, | Performed by: NURSE PRACTITIONER

## 2021-11-09 PROCEDURE — 1159F MED LIST DOCD IN RCRD: CPT | Mod: CPTII,S$GLB,, | Performed by: NURSE PRACTITIONER

## 2021-11-09 PROCEDURE — 3008F PR BODY MASS INDEX (BMI) DOCUMENTED: ICD-10-PCS | Mod: CPTII,S$GLB,, | Performed by: NURSE PRACTITIONER

## 2021-11-09 PROCEDURE — 99204 OFFICE O/P NEW MOD 45 MIN: CPT | Mod: S$GLB,,, | Performed by: NURSE PRACTITIONER

## 2021-11-09 PROCEDURE — 3080F PR MOST RECENT DIASTOLIC BLOOD PRESSURE >= 90 MM HG: ICD-10-PCS | Mod: CPTII,S$GLB,, | Performed by: NURSE PRACTITIONER

## 2021-11-09 RX ORDER — AMOXICILLIN AND CLAVULANATE POTASSIUM 875; 125 MG/1; MG/1
1 TABLET, FILM COATED ORAL EVERY 12 HOURS
Qty: 14 TABLET | Refills: 0 | Status: SHIPPED | OUTPATIENT
Start: 2021-11-09 | End: 2021-11-16

## 2021-11-09 RX ORDER — CIPROFLOXACIN AND DEXAMETHASONE 3; 1 MG/ML; MG/ML
4 SUSPENSION/ DROPS AURICULAR (OTIC) 2 TIMES DAILY
Qty: 7.5 ML | Refills: 0 | Status: SHIPPED | OUTPATIENT
Start: 2021-11-09 | End: 2021-11-16